# Patient Record
Sex: MALE | Race: WHITE | NOT HISPANIC OR LATINO | URBAN - METROPOLITAN AREA
[De-identification: names, ages, dates, MRNs, and addresses within clinical notes are randomized per-mention and may not be internally consistent; named-entity substitution may affect disease eponyms.]

---

## 2017-06-14 ENCOUNTER — OFFICE VISIT (OUTPATIENT)
Dept: PSYCHIATRY | Facility: CLINIC | Age: 32
End: 2017-06-14
Payer: COMMERCIAL

## 2017-06-14 VITALS
DIASTOLIC BLOOD PRESSURE: 69 MMHG | SYSTOLIC BLOOD PRESSURE: 115 MMHG | BODY MASS INDEX: 21.77 KG/M2 | HEIGHT: 69 IN | HEART RATE: 74 BPM | WEIGHT: 147 LBS

## 2017-06-14 DIAGNOSIS — F41.1 GENERALIZED ANXIETY DISORDER: Primary | ICD-10-CM

## 2017-06-14 DIAGNOSIS — F40.10 SOCIAL ANXIETY DISORDER: ICD-10-CM

## 2017-06-14 DIAGNOSIS — F41.0 PANIC DISORDER WITHOUT AGORAPHOBIA: ICD-10-CM

## 2017-06-14 PROCEDURE — 99999 PR PBB SHADOW E&M-NEW PATIENT-LVL III: CPT | Mod: PBBFAC,,, | Performed by: NURSE PRACTITIONER

## 2017-06-14 RX ORDER — LORAZEPAM 0.5 MG/1
0.5 TABLET ORAL DAILY PRN
Qty: 10 TABLET | Refills: 0 | Status: SHIPPED | OUTPATIENT
Start: 2017-06-14 | End: 2017-07-17 | Stop reason: SDUPTHER

## 2017-06-14 RX ORDER — SERTRALINE HYDROCHLORIDE 100 MG/1
TABLET, FILM COATED ORAL
Qty: 30 TABLET | Refills: 5 | Status: SHIPPED | OUTPATIENT
Start: 2017-06-14 | End: 2017-08-02 | Stop reason: SINTOL

## 2017-06-14 NOTE — PATIENT INSTRUCTIONS
[facility] - DEPARTMENT OF PSYCHIATRY   PATIENT INFORMATION    We appreciate the opportunity to participate in your medical care and hope the following protocols will make it easier for you to receive quality treatment in our department.    1. PUNCTUALITY: Your appointment is scheduled for a fixed amount of time.  To get the benefit of your appointment, please arrive at least 15 minutes early enough to allow time for traffic, parking and registration.  If you are late for your appointment, you may have to reschedule.  Please make every effort to be on time.      2. PAYMENT FOR SERVICES:   Payments are expected at the time of service.  Please contact [phone] if you need to resolve issues involving your account at [facility] or to set up a payment plan.    3. CANCELLATION / MISSED APPOINTMENTS:   In order to receive quality care, all appointments must be kept.  Appointment may be cancelled at least 24 hours before your appointment time. If you do not give at least 24-hour notice of cancellation a fee may be billed directly to the patient.  Please note that insurance does not cover no-show charges, so you will be billed directly.  If you are consistently late, cancel, or do not show for your appointments, our department reserves the right to terminate treatment     MESSAGES- In general you can reach the department by calling [phone], between 8:00 a.m. and 5:00 p.m., Monday through Friday.  You can also use the [facility] Patient Portal.     AFTER HOURS, WEEKEND OR HOLIDAYS- For urgent questions after hours, weekends and holidays, calling the department number [phone] will connect you with a representative.  EMERGENCY-  In case of a crisis when there is a concern of harm to self or others, call 911 or the office [phone] between 8:00 a.m. and 5:00 p.m., Monday through Friday or go to the [facility] Emergency Room.    4. PRESCRIPTION REFILLS:  Prescription refills must be done at your  physician office visit, You will be given a sufficient number of refills to last until your next appointment, you must come to appointments for prescriptions.       5. FOLLOW UP APPOINTMENTS:  Follow-up appointments can be made in person at the Psychiatry Appointment Desk, online through the [facility] Patient Portal, or by calling [phone], from 8am to 5pm, between Monday and Friday.  Patients are responsible for scheduling their own follow-up appointment,  It  Is recommended you schedule your appointment before leaving the clinic.    6. Providers are NOT ABLE to schedule appointments; all appointments must be made through the appointment department or through [facility] Patient Portal.     Cancellation Policy:  Please provide greater than 24 hour notice of any cancellations as a fee will be charged for failure to do so. This policy is in effect to allow for other individuals on a long waiting list to be seen as soon as possible. Unlike other branches of medicine where several individuals can be scheduled in a 15 minute time slot, only one individual can be scheduled in any time slot in Psychiatry.    Walk-in appointments:      Walk in appointments are not available. For emergencies, please go to the Emergency Room.    [facility]: Please enroll as this is the preferred method of contacting your doctor for non-emergent calls. It will also allow you to book your own follow up appointments.    No Shows: Repeated no shows may result in a warning letter or you may be asked to seek care elsewhere.    Phone Calls: Please discuss concerns with your doctor within your scheduled appointment time. In most cases Psychotherapy and Medication management are not appropriate by telephone. If you have a simple question/concern, please provide all of the following information to the :   Detailed description of concern   Pharmacy name and phone number   Date of last visit and next scheduled visit   Phone number  where you can be reached throughout the day   Indication as to whether leaving a voice mail or message on an answering machine is appropriate (if applicable)  Calls will be returned by the Medical Assistant or Office Staff after your doctor has reviewed the message.  Please allow 24 hours for a returned phone call before calling back to leave another message. (If numerous calls are made between appointments, or if calls end up being lengthy, more frequent appointments are required for proper management.)    Disability: We do not do disability evaluations.  Please contact Social Security Administration for evaluations and determinations. You will then sign releases allowing for records from this office to be forwarded to Social Security Administration to use in their evaluation.    Gun Permit: We do not offer Sound Judgment Evaluations or assessments leading to gun ownership, nor do we fill out or file paperwork relevant to owning, concealing or purchasing a firearm.        Emotional Support     Animals: ESAs are not trained to perform tasks or recognize particular signs or symptoms but are distinguished by the close, emotional, and supportive bond between the animal and the owner; therefore we do not provide documentation, including letters, to aid in the acclamation that an Emotional Support Animal is required.      Samples: We do not provide samples of any medications. If you have financial difficulties and are on a limited income, you may qualify for Patient Assistance Programs from various pharmaceutical companies. This will require that you complete paperwork with your financial information, but this does not guarantee that the company will approve the application. Alternative medication options can be discussed.    Controlled Medications: Some medications are tightly controlled and regulated by the FDA and [employer].  Controlled medications will not be refilled before 30 days. Some of these medications will not  be refilled without a face to face appointment. For some individuals, monthly appointments may have to be scheduled.    Forms: If you have any forms or letters that need to be completed by your doctor, please present these at the beginning of the appointment. Forms, letters, etc. will not be completed outside of appointment times. Please provide a full name and address of the recipient. Anonymous letters will not be dictated. We do not have disability forms, FMLA forms, etc. on file.  You will need to obtain the necessary paperwork from your employer/school.    Limitations: You will be referred to other providers if we feel unable to adequately diagnose or treat your particular condition, or if collaboration with another provider would allow for better management of your condition.    Revised 4/12/2017    PATIENTS MAY EXPERIENCE SYMPTOMS OF WITHDRAWAL IF THEY RUN OUT OF MEDICATIONS.  THE PATIENT WILL ASSUME ALL RESPONSIBILITIES OF ANY OUTCOMES WHEN THERE IS AN ISSUE WITH NONCOMPLIANCE WITH FOLLOW-UP APPOINTMENTS AND MEDICATIONS.        THERE IS TO BE NO USE OF ALCOHOL AND/OR ILLEGAL SUBSTANCES      PATIENT ARE TO GO TO THE CLOSEST EMERGENCY ROOM IF FEELING A THREAT TO THEMSELVES OR OTHER OR IF GRAVELY DISABLED    TELL US HOW WE ARE DOING.  PLEASE COMPLETE THE PATIENT SATISFACTION SURVERY

## 2017-06-14 NOTE — PROGRESS NOTES
Outpatient Psychiatry Initial Visit (MD/NP)    6/14/2017    Pablo Kathleen, a 32 y.o. male, presenting for initial evaluation visit. Met with patient.    Reason for Encounter: self-referral. Patient complains of   Chief Complaint   Patient presents with    Anxiety   .    History of Present Illness: Patient presents to [facility], chart reviewed.  History of ADHD at age 19, was given Adderall. 2004 had DWI with no incarceration.  Patient report experiencing anxiety as a teen, was always nervous and worrying.  Patient states the older he becomes the more intense his anxiety becomes. 2005 when to pediatrician, MD gave Lexapro with poor outcomes.  Approx 1 year ago anxiety became more prominate, he and GF move to [facility], his son was born.  6 months prior patient was  from wife of 7 years. Currently patient experiences chronic daily anxiety, extreme worry, experiencing social anxiety, has a difficulty with driving and navagating, riding in a car is worse.  Easily frustrated, agitated and irritated, little patience for others, at times becomes verbally aggressive, has to apologize to friend and family.  Panic attacks 4-5/week, Rapid HR, SOB, excessive sweating, muscle tension in neck, loss of appetite.  Anxiety is causing him to experience low mood.  Admits he has days where he feels he cant leave the house but is able. Wakes with anxiety in the morning.  Patient reports steady sleep pattern      Current Meds: None    Past Psy History    Hospitalizations: Denies  Medications: Lexapro (emotionally flat, thoughts of death), Adderall, Buspar, Tranzene  Emily: denies  Self-Injurious Behaviors: denies  MH Providers: None since 2005  Suicide Attempts: denies  Violent Behaviors: denies    Review Of Systems:     GENERAL:  No weight gain or loss  SKIN:  No rashes or lacerations  HEAD:  No headaches  EYES:  No exophthalmos, jaundice or blindness  EARS:  No dizziness, tinnitus or hearing loss  NOSE:  No changes in  "smell  MOUTH & THROAT:  No dyskinetic movements or obvious goiter  CHEST:  No shortness of breath, hyperventilation or cough  CARDIOVASCULAR:  No tachycardia or chest pain  ABDOMEN:  No nausea, vomiting, pain, constipation or diarrhea  URINARY:  No frequency, dysuria or sexual dysfunction  ENDOCRINE:  No polydipsia, polyuria  MUSCULOSKELETAL:  No pain or stiffness of the joints  NEUROLOGIC:  No weakness, sensory changes, seizures, confusion, memory loss, tremor or other abnormal movements    Current Evaluation:     Nutritional Screening: Considering the patient's height and weight, medications, medical history and preferences, should a referral be made to the dietitian? no    Constitutional  Vitals:  Most recent vital signs, dated less than 90 days prior to this appointment, were reviewed.    Vitals:    06/14/17 1310   BP: 115/69   Pulse: 74   Weight: 66.7 kg (147 lb)   Height: 5' 9" (1.753 m)        General:  unremarkable, age appropriate, casually dressed, thin & gaunt looking     Musculoskeletal  Muscle Strength/Tone:  no dyskinesia, no dystonia, no tremor, no tic   Gait & Station:  non-ataxic     Psychiatric  Speech:  no latency; no press   Mood & Affect:  anxious  blunted   Thought Process:  normal and logical   Associations:  intact   Thought Content:  normal, no suicidality, no homicidality, delusions, or paranoia   Insight:  has awareness of illness   Judgement: behavior is adequate to circumstances   Orientation:  grossly intact   Memory: intact for content of interview   Language: grossly intact   Attention Span & Concentration:  able to focus   Fund of Knowledge:  intact and appropriate to age and level of education       Relevant Elements of Neurological Exam: normal gait    Functioning in Relationships:  Spouse/partner: has GF  Peers: has support  Employers: FT employment    Laboratory Data  No visits with results within 1 Month(s) from this visit.   Latest known visit with results is:   No results " found for any previous visit.         Medications  No outpatient encounter prescriptions on file as of 6/14/2017.     No facility-administered encounter medications on file as of 6/14/2017.            Assessment - Diagnosis - Goals:     Impression:       ICD-10-CM ICD-9-CM   1. Generalized anxiety disorder F41.1 300.02   2. Panic disorder without agoraphobia F41.0 300.01   3. Social anxiety disorder F40.10 300.23       Strengths and Liabilities: Strength: Patient accepts guidance/feedback, Liability: Patient lacks coping skills.    Treatment Goals:  Specify outcomes written in observable, behavioral terms:   Anxiety: acquiring relapse prevention skills, eliminating all anxiety symptoms (SCL-90-R scores in normal range), reducing negative automatic thoughts, reducing physical symptoms of anxiety and reducing time spent worrying (<30 minutes/day)  Depression: eliminating all depressive symptoms (BDI score <10 for 1 month) and reducing negative automatic thoughts  Panic: acquiring breathing skills, acquiring relapse prevention skills, no panic attacks for 1 month and reducing physical symptoms of anxiety/panic    Treatment Plan/Recommendations:   · Medication Management: Continue current medications. The risks and benefits of medication were discussed with the patient.  · The treatment plan and follow up plan were reviewed with the patient.   · Trial Zoloft 100mg half tab po q day x 14 then 1 tab by mouth every morning  · Ativan 0.5mg po q day prn, qty 10  · To ER if H/SI/gravely disabled  · Patient verbalized understanding of recommendations      Return to Clinic: 6 weeks    Total time: 45 min  Consulting clinician was informed of the encounter and consult note.

## 2017-07-04 ENCOUNTER — PATIENT MESSAGE (OUTPATIENT)
Dept: PSYCHIATRY | Facility: CLINIC | Age: 32
End: 2017-07-04

## 2017-07-15 ENCOUNTER — PATIENT MESSAGE (OUTPATIENT)
Dept: PSYCHIATRY | Facility: CLINIC | Age: 32
End: 2017-07-15

## 2017-07-17 DIAGNOSIS — F41.0 PANIC DISORDER WITHOUT AGORAPHOBIA: ICD-10-CM

## 2017-07-17 RX ORDER — LORAZEPAM 0.5 MG/1
0.5 TABLET ORAL DAILY PRN
Qty: 10 TABLET | Refills: 0 | Status: SHIPPED | OUTPATIENT
Start: 2017-07-17 | End: 2017-08-02 | Stop reason: SDUPTHER

## 2017-08-02 ENCOUNTER — OFFICE VISIT (OUTPATIENT)
Dept: PSYCHIATRY | Facility: CLINIC | Age: 32
End: 2017-08-02
Payer: COMMERCIAL

## 2017-08-02 VITALS
SYSTOLIC BLOOD PRESSURE: 110 MMHG | HEART RATE: 60 BPM | WEIGHT: 148 LBS | HEIGHT: 69 IN | BODY MASS INDEX: 21.92 KG/M2 | DIASTOLIC BLOOD PRESSURE: 67 MMHG

## 2017-08-02 DIAGNOSIS — F41.0 PANIC DISORDER WITHOUT AGORAPHOBIA: ICD-10-CM

## 2017-08-02 DIAGNOSIS — F41.1 GENERALIZED ANXIETY DISORDER: Primary | ICD-10-CM

## 2017-08-02 DIAGNOSIS — F40.10 SOCIAL PHOBIA: ICD-10-CM

## 2017-08-02 PROCEDURE — 99214 OFFICE O/P EST MOD 30 MIN: CPT | Mod: S$GLB,,,

## 2017-08-02 PROCEDURE — 3008F BODY MASS INDEX DOCD: CPT | Mod: S$GLB,,,

## 2017-08-02 PROCEDURE — 99999 PR PBB SHADOW E&M-EST. PATIENT-LVL II: CPT | Mod: PBBFAC,,,

## 2017-08-02 RX ORDER — LORAZEPAM 0.5 MG/1
0.5 TABLET ORAL DAILY PRN
Qty: 10 TABLET | Refills: 2 | Status: SHIPPED | OUTPATIENT
Start: 2017-08-16 | End: 2017-09-27 | Stop reason: SDUPTHER

## 2017-08-02 RX ORDER — MIRTAZAPINE 15 MG/1
15 TABLET, FILM COATED ORAL NIGHTLY
Qty: 30 TABLET | Refills: 2 | Status: SHIPPED | OUTPATIENT
Start: 2017-08-02 | End: 2018-08-02

## 2017-08-02 RX ORDER — PROPRANOLOL HYDROCHLORIDE 20 MG/1
20 TABLET ORAL DAILY PRN
Qty: 30 TABLET | Refills: 2 | Status: SHIPPED | OUTPATIENT
Start: 2017-08-02 | End: 2018-08-02

## 2017-08-09 NOTE — PROGRESS NOTES
Outpatient Psychiatry Follow-Up Visit (MD/NP)    8/2/2017    Clinical Status of Patient: Outpatient (Ambulatory)    Chief Complaint:  Pablo Kathleen is a 32 y.o. male who presents today for follow-up of anxiety.  Met with patient.    Interval History and Content of Current Session:  Interim Events/Subjective Report/Content of Current Session: Previous patient of Dr. Valverde presented for follow-up of anxiety disorder, Dr. Valverde's notes were reviewed prior to this encounter. Patient has been started on zoloft by Dr. Valverde, reports today that he felt dizzy and light-headed on zoloft and stopped this medication. Past trials include lexapro (pt felt as if he had no emotions), adderall for ADHD, tranxene for insomnia and anxiety, and buspar for anxiety. Patient has had multiple adverse effects while taking SSRIs, we discussed non-SSRI alternatives to treating anxiety.    Review of Systems   Review of Systems   Constitutional: Negative for fever.   HENT: Negative for congestion.    Eyes: Negative for discharge.   Respiratory: Negative for apnea.    Cardiovascular: Negative for leg swelling.   Gastrointestinal: Negative for abdominal distention.   Endocrine: Negative for cold intolerance.   Genitourinary: Negative for difficulty urinating.   Musculoskeletal: Negative for arthralgias.   Skin: Negative for color change.   Allergic/Immunologic: Negative for immunocompromised state.   Neurological: Negative for dizziness.   Hematological: Negative for adenopathy.   Psychiatric/Behavioral: The patient is nervous/anxious.      Past Medical, Family and Social History: The patient's past medical, family and social history have been reviewed and updated as appropriate within the electronic medical record - see encounter notes.    Compliance: no    Side effects: see above    Risk Parameters:  Patient reports no suicidal ideation  Patient reports no homicidal ideation  Patient reports no self-injurious behavior  Patient reports no  "violent behavior    Exam (detailed: at least 9 elements; comprehensive: all 15 elements)   Constitutional  Vitals:  Most recent vital signs, dated Visit date not found, were reviewed.   Vitals:    08/02/17 1545   BP: 110/67   Pulse: 60   Weight: 67.1 kg (148 lb)   Height: 5' 9" (1.753 m)        General:  unremarkable, age appropriate, normal weight, casually dressed, neatly groomed     Musculoskeletal  Muscle Strength/Tone:  not examined   Gait & Station:  non-ataxic     Psychiatric  Speech:  no latency; no press   Mood & Affect:  anxious  congruent and appropriate   Thought Process:  normal and logical   Associations:  intact   Thought Content:  normal, no suicidality, no homicidality, delusions, or paranoia   Insight:  intact   Judgement: behavior is adequate to circumstances   Orientation:  grossly intact   Memory: intact for content of interview   Language: grossly intact   Attention Span & Concentration:  able to focus   Fund of Knowledge:  intact and appropriate to age and level of education     No visits with results within 1 Month(s) from this visit.   Latest known visit with results is:   No results found for any previous visit.       Assessment and Diagnosis     General Impression: 32-year-old male with history of anxiety disorder. He had had multiple failed trials of SSRIs and other medications for anxiety, discussed alternatives.      ICD-10-CM ICD-9-CM   1. Generalized anxiety disorder F41.1 300.02   2. Panic disorder without agoraphobia F41.0 300.01   3. Social phobia F40.10 300.23       Intervention/Counseling/Treatment Plan   · Start remeron 15mg qHS for insomnia/anxiety, discussed risks/benefits/alternatives including risk of sedation and risk of increased appetite and weight gain  · Start propranolol 20mg daily as needed for anxiety, discussed risks/benefits/alternatives including risk of hypotension and fatigue  · Continue lorazepam 0.5mg daily as needed for severe anxiety, discussed risk of abuse " and dependence with patient, #10 tablets authorized.    Return to Clinic: 1 month

## 2017-08-31 ENCOUNTER — PATIENT MESSAGE (OUTPATIENT)
Dept: PSYCHIATRY | Facility: CLINIC | Age: 32
End: 2017-08-31

## 2017-09-26 ENCOUNTER — PATIENT MESSAGE (OUTPATIENT)
Dept: PSYCHIATRY | Facility: CLINIC | Age: 32
End: 2017-09-26

## 2017-09-27 DIAGNOSIS — F41.0 PANIC DISORDER WITHOUT AGORAPHOBIA: ICD-10-CM

## 2017-09-27 RX ORDER — LORAZEPAM 0.5 MG/1
0.5 TABLET ORAL DAILY PRN
Qty: 10 TABLET | Refills: 2 | Status: SHIPPED | OUTPATIENT
Start: 2017-09-27